# Patient Record
Sex: MALE | Race: WHITE | NOT HISPANIC OR LATINO | Employment: UNEMPLOYED | URBAN - METROPOLITAN AREA
[De-identification: names, ages, dates, MRNs, and addresses within clinical notes are randomized per-mention and may not be internally consistent; named-entity substitution may affect disease eponyms.]

---

## 2020-11-23 ENCOUNTER — COMMUNICATION - HEALTHEAST (OUTPATIENT)
Dept: SCHEDULING | Facility: CLINIC | Age: 40
End: 2020-11-23

## 2020-11-23 ENCOUNTER — AMBULATORY - HEALTHEAST (OUTPATIENT)
Dept: BEHAVIORAL HEALTH | Facility: CLINIC | Age: 40
End: 2020-11-23

## 2021-04-14 ENCOUNTER — AMBULATORY - HEALTHEAST (OUTPATIENT)
Dept: CARE COORDINATION | Facility: HOSPITAL | Age: 41
End: 2021-04-14

## 2021-04-24 ENCOUNTER — COMMUNICATION - HEALTHEAST (OUTPATIENT)
Dept: SCHEDULING | Facility: CLINIC | Age: 41
End: 2021-04-24

## 2021-06-13 NOTE — PROGRESS NOTES
S: 40/M to F, [facility] ED, post SA. Report from GUILLERMO Thompson, at 0530.     B: Hx of alcohol use, MDD, PTSD and DONNA.   Pt arrives to the ED post SA via overdosing on 15 tablets of trazodone and alcohol.   Pt reports depressed mood for months and has had frequent thoughts of suicide, increased anxiety, feelings of hopelessness and helplessness. Pt lost her housing in May, has been couch surfing and living in her vehicle.   Pt reports she was discharged from [facility] a few days ago and did not follow up w/ OP services nor medication regiment.   Pt denies SIB/HI and psychotic sxs.  Pt reports regular etoh use and huffing aerosols. Pt reports hx of alcohol withdrawal seizures.    A: Voluntary  No medical concerns. Ambulates / drink / eats ok.   Asymptomatic for Covid - test has been collected and is in process.    at 2234. Drug screen not ordered.     R: Pt placed on wait list pending available placement.

## 2021-06-13 NOTE — PROGRESS NOTES
R:  11/23/20  11:40 AM  Patient accepted by [facility] on 5500.  Placed in 5500 queue.  Called [facility]  ED w/ update and also requesting a Utox be ordered and collected.  Called 5500 and disposition given.    R:  1 PM  Called 5500-per charge nurse, nurse to nurse can be done in 20 minutes and then patient can transfer to unit.  Updated patient's ED RN and provider.

## 2021-06-16 PROBLEM — T50.901A OVERDOSE: Status: ACTIVE | Noted: 2020-11-23

## 2021-06-16 PROBLEM — F41.1 GAD (GENERALIZED ANXIETY DISORDER): Chronic | Status: ACTIVE | Noted: 2020-11-23

## 2021-06-16 PROBLEM — F10.20 SEVERE ALCOHOL USE DISORDER (H): Chronic | Status: ACTIVE | Noted: 2020-11-23

## 2021-06-16 PROBLEM — F33.1 MAJOR DEPRESSIVE DISORDER, RECURRENT EPISODE, MODERATE (H): Chronic | Status: ACTIVE | Noted: 2021-04-15

## 2021-06-16 PROBLEM — F43.12 CHRONIC POST-TRAUMATIC STRESS DISORDER (PTSD): Chronic | Status: ACTIVE | Noted: 2020-11-23

## 2021-06-16 PROBLEM — F18.10: Chronic | Status: ACTIVE | Noted: 2020-11-23

## 2021-06-16 NOTE — PROGRESS NOTES
S: Yessenia, [facility] ED, 40/F she/her pronouns, SI     B: Hx of MDD, PTSD, sub use, TBI   Pt reports SI, chronically   Pt reports she is homeless and has not been taking her medications   Pt breathalyzer in the ED .045, denies any other sub use   Pt denies SIB   Pt reports that she is in the ED seeking help w safe housing. She completed tx and has been shuffling around. Pt reports that she doesn't have a  but would like someone to help her w housing     Med cleared   No covid concerns, test pending     A: Voluntary     R: 5500/Nissa     1003pm - Nissa, on call provider, paged   1005pm - Nissa accepts   Pt placed in queue   1008pm - unit charge unavail, will call intake back when able   1028pm - unit charge notified, after midnight for report   1028pm - ED SW notified

## 2023-02-24 ENCOUNTER — TRANSFERRED RECORDS (OUTPATIENT)
Dept: HEALTH INFORMATION MANAGEMENT | Facility: CLINIC | Age: 43
End: 2023-02-24

## 2023-03-03 ENCOUNTER — TRANSFERRED RECORDS (OUTPATIENT)
Dept: HEALTH INFORMATION MANAGEMENT | Facility: CLINIC | Age: 43
End: 2023-03-03

## 2024-05-22 ENCOUNTER — TELEPHONE (OUTPATIENT)
Dept: NURSING | Facility: CLINIC | Age: 44
End: 2024-05-22
Payer: MEDICARE

## 2024-05-22 NOTE — TELEPHONE ENCOUNTER
LILA Patel from [facility] [phone]    Received Fax- placed in your in box.for signature and to fax back.    Lead MA and Lead TC are working to accommodate arrival and check in process.

## 2024-05-22 NOTE — TELEPHONE ENCOUNTER
Lead Lisa and I called Amanda DE SOUZA, RN [facility] Nurse and informed her that we will fax her over a general consent for service to have patient sign ahead of time and fax back to us at [phone] Attn Lisa VENTURA And then once patient arrives for appointment we will have a quick check in and MA will be ready to bring patient right back to exam room to see provider for appointment to try to avoid wait time in [facility] area.

## 2024-05-28 ENCOUNTER — MEDICAL CORRESPONDENCE (OUTPATIENT)
Dept: HEALTH INFORMATION MANAGEMENT | Facility: CLINIC | Age: 44
End: 2024-05-28

## 2024-05-28 ENCOUNTER — OFFICE VISIT (OUTPATIENT)
Dept: FAMILY MEDICINE | Facility: CLINIC | Age: 44
End: 2024-05-28
Payer: MEDICARE

## 2024-05-28 VITALS
DIASTOLIC BLOOD PRESSURE: 80 MMHG | SYSTOLIC BLOOD PRESSURE: 128 MMHG | BODY MASS INDEX: 34.07 KG/M2 | TEMPERATURE: 98.1 F | WEIGHT: 230 LBS | OXYGEN SATURATION: 98 % | RESPIRATION RATE: 16 BRPM | HEART RATE: 106 BPM | HEIGHT: 69 IN

## 2024-05-28 DIAGNOSIS — Z91.89 ENCOUNTER FOR HEPATITIS C VIRUS SCREENING TEST FOR HIGH RISK PATIENT: ICD-10-CM

## 2024-05-28 DIAGNOSIS — Z51.81 ENCOUNTER FOR THERAPEUTIC DRUG MONITORING: ICD-10-CM

## 2024-05-28 DIAGNOSIS — F19.10 POLYSUBSTANCE ABUSE (H): ICD-10-CM

## 2024-05-28 DIAGNOSIS — F10.20 SEVERE ALCOHOL USE DISORDER (H): Chronic | ICD-10-CM

## 2024-05-28 DIAGNOSIS — F19.20 OTHER PSYCHOACTIVE SUBSTANCE DEPENDENCE, UNCOMPLICATED (H): ICD-10-CM

## 2024-05-28 DIAGNOSIS — Z11.59 ENCOUNTER FOR HEPATITIS C VIRUS SCREENING TEST FOR HIGH RISK PATIENT: ICD-10-CM

## 2024-05-28 DIAGNOSIS — B18.2 CHRONIC HEPATITIS C WITHOUT HEPATIC COMA (H): ICD-10-CM

## 2024-05-28 DIAGNOSIS — F64.0 GENDER DYSPHORIA IN ADULT: Primary | ICD-10-CM

## 2024-05-28 LAB
ALBUMIN SERPL BCG-MCNC: 4.4 G/DL (ref 3.5–5.2)
ALP SERPL-CCNC: 61 U/L (ref 40–150)
ALT SERPL W P-5'-P-CCNC: 97 U/L (ref 0–70)
ANION GAP SERPL CALCULATED.3IONS-SCNC: 12 MMOL/L (ref 7–15)
AST SERPL W P-5'-P-CCNC: 61 U/L (ref 0–45)
BASOPHILS # BLD AUTO: 0 10E3/UL (ref 0–0.2)
BASOPHILS NFR BLD AUTO: 0 %
BILIRUB SERPL-MCNC: 0.4 MG/DL
BUN SERPL-MCNC: 14.7 MG/DL (ref 6–20)
CALCIUM SERPL-MCNC: 9.3 MG/DL (ref 8.6–10)
CHLORIDE SERPL-SCNC: 106 MMOL/L (ref 98–107)
CREAT SERPL-MCNC: 0.77 MG/DL (ref 0.51–1.17)
DEPRECATED HCO3 PLAS-SCNC: 22 MMOL/L (ref 22–29)
EGFRCR SERPLBLD CKD-EPI 2021: >90 ML/MIN/1.73M2
EOSINOPHIL # BLD AUTO: 0.1 10E3/UL (ref 0–0.7)
EOSINOPHIL NFR BLD AUTO: 2 %
ERYTHROCYTE [DISTWIDTH] IN BLOOD BY AUTOMATED COUNT: 12.3 % (ref 10–15)
GLUCOSE SERPL-MCNC: 133 MG/DL (ref 70–99)
HCT VFR BLD AUTO: 41.5 % (ref 35–53)
HCV AB SERPL QL IA: REACTIVE
HGB BLD-MCNC: 14 G/DL (ref 11.7–17.7)
IMM GRANULOCYTES # BLD: 0 10E3/UL
IMM GRANULOCYTES NFR BLD: 0 %
LYMPHOCYTES # BLD AUTO: 1.8 10E3/UL (ref 0.8–5.3)
LYMPHOCYTES NFR BLD AUTO: 42 %
MCH RBC QN AUTO: 30 PG (ref 26.5–33)
MCHC RBC AUTO-ENTMCNC: 33.7 G/DL (ref 31.5–36.5)
MCV RBC AUTO: 89 FL (ref 78–100)
MONOCYTES # BLD AUTO: 0.6 10E3/UL (ref 0–1.3)
MONOCYTES NFR BLD AUTO: 13 %
NEUTROPHILS # BLD AUTO: 1.9 10E3/UL (ref 1.6–8.3)
NEUTROPHILS NFR BLD AUTO: 43 %
PLATELET # BLD AUTO: 275 10E3/UL (ref 150–450)
POTASSIUM SERPL-SCNC: 4 MMOL/L (ref 3.4–5.3)
PROT SERPL-MCNC: 7.9 G/DL (ref 6.4–8.3)
RBC # BLD AUTO: 4.67 10E6/UL (ref 3.8–5.9)
SHBG SERPL-SCNC: 81 NMOL/L (ref 11–135)
SODIUM SERPL-SCNC: 140 MMOL/L (ref 135–145)
WBC # BLD AUTO: 4.4 10E3/UL (ref 4–11)

## 2024-05-28 PROCEDURE — 84403 ASSAY OF TOTAL TESTOSTERONE: CPT | Performed by: FAMILY MEDICINE

## 2024-05-28 PROCEDURE — 85025 COMPLETE CBC W/AUTO DIFF WBC: CPT | Mod: QW | Performed by: FAMILY MEDICINE

## 2024-05-28 PROCEDURE — 99000 SPECIMEN HANDLING OFFICE-LAB: CPT | Performed by: FAMILY MEDICINE

## 2024-05-28 PROCEDURE — 36415 COLL VENOUS BLD VENIPUNCTURE: CPT | Performed by: FAMILY MEDICINE

## 2024-05-28 PROCEDURE — 80053 COMPREHEN METABOLIC PANEL: CPT | Performed by: FAMILY MEDICINE

## 2024-05-28 PROCEDURE — 87902 NFCT AGT GNTYP ALYS HEP C: CPT | Mod: 90 | Performed by: FAMILY MEDICINE

## 2024-05-28 PROCEDURE — 84270 ASSAY OF SEX HORMONE GLOBUL: CPT | Performed by: FAMILY MEDICINE

## 2024-05-28 PROCEDURE — 86803 HEPATITIS C AB TEST: CPT | Performed by: FAMILY MEDICINE

## 2024-05-28 PROCEDURE — 99204 OFFICE O/P NEW MOD 45 MIN: CPT | Performed by: FAMILY MEDICINE

## 2024-05-28 PROCEDURE — 87522 HEPATITIS C REVRS TRNSCRPJ: CPT | Performed by: FAMILY MEDICINE

## 2024-05-28 RX ORDER — ESTRADIOL VALERATE 20 MG/ML
INJECTION INTRAMUSCULAR
COMMUNITY
Start: 2023-03-01 | End: 2024-07-30

## 2024-05-28 RX ORDER — HYDROXYZINE HYDROCHLORIDE 25 MG/1
25 TABLET, FILM COATED ORAL
COMMUNITY
Start: 2022-09-28

## 2024-05-28 RX ORDER — ESTRADIOL 2 MG/1
2 TABLET ORAL DAILY
Qty: 30 TABLET | Refills: 1 | Status: SHIPPED | OUTPATIENT
Start: 2024-05-28 | End: 2024-07-30

## 2024-05-28 RX ORDER — AMLODIPINE BESYLATE 5 MG/1
5 TABLET ORAL DAILY
COMMUNITY
Start: 2022-09-29 | End: 2025-04-17

## 2024-05-28 RX ORDER — SPIRONOLACTONE 100 MG/1
100 TABLET, FILM COATED ORAL DAILY
Qty: 60 TABLET | Refills: 1 | Status: SHIPPED | OUTPATIENT
Start: 2024-05-28
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
## 2024-05-28 NOTE — PATIENT INSTRUCTIONS
" Name__________________   and     MRN___________________   or   Label Here        Informed Consent   Feminizing Medications      This form refers to the use of estrogen and/or androgen antagonists (sometimes called \"anti-androgens\" or \"androgen blockers\") by persons in the male-to-female spectrum who wish to become feminized to reduce gender dysphoria and facilitate a more feminine gender presentation. While there are risks associated with taking feminizing medications, when appropriately prescribed they can greatly improve mental health and quality of life.     Please seek another opinion if you want additional perspective on any aspect of your care.       Feminizing Effects     1. I understand that estrogen, androgen antagonists, or a combination of the two may be  prescribed to reduce male physical features and feminize my body.     2. I understand that the feminizing effects of estrogen and androgen antagonists can take several  months to a year to become noticeable, speed and degree of change is unpredictable.     3.  I understand that if I am taking estrogen I will probably develop breasts, and:        Breasts may take several years to develop to their full size.   Even if estrogen is stopped, the breast tissue that has developed will remain.   As soon as breasts start growing, it is recommended to have an annual breast exam.  There may be milky nipple discharge (galactorrhea). If you develop this, it is advised to check with a doctor to determine the cause.   It is not known if taking estrogen increases the risk of breast cancer.     4. I understand that the following changes are generally not permanent (that is, they will likely reverse if I stop taking feminizing medications):     Skin may become softer.   Muscle mass decreases and there may be a decrease in upper body strength.   Body hair growth may become less noticeable and grow more slowly,but won't stop.  Male pattern baldness may slow down, but will " "probably not stop completely, and hair that has already been lost will likely not grow back.   Fat may redistribute to a more feminine pattern (decreased in abdomen, increased on buttocks/hips/thighs - changing from \"apple shape\" to \"pear shape\").       5. I understand that taking feminizing medications will make my testicles produce less testosterone, which can affect my overall sexual function:    Fertility may be impaired, and I should consider sperm banking if I desire biological children.  Sperm may not mature, leading to reduced fertility. It is still possible to get someone pregnant however and contraception should be used if needed.  Testicles may shrink by 25-50%. Testicular examinations are still recommended.  The amount of fluid ejaculated may be reduced.   There is typically decrease in morning and spontaneous erections.   Erections may not be firm enough for penetrative sex.   Libido (sex drive) may decrease.     6. I understand that there are some aspects of my body that are not significantly changed by feminizing medications, though there may be other treatments that can be helpful.    Blackmon/facial hair may grow more slowly and be less noticeable, but will not go away.   Voice pitch will not rise and speech patterns will not become more feminine.   The laryngeal prominence (\"Larry's apple\") will not shrink.      Risks of Feminizing Medications     7. I understand that the medical effects and safety of feminizing medications are not fully understood, and that there may be long-term risks that are not yet known.     8. I understand that I am strongly advised not to take more medication than I am prescribed, as this increases health risks. It won't help changes come faster.     9. I understand that feminizing medications can damage the liver. I have been advised that I should be monitored for possible liver damage as long as I am taking feminizing medications.     10. I understand that feminizing " medications will result in changes that will be noticeable by other people, and that some people in similar circumstances have experienced harassment, discrimination, and violence, while others have lost support of loved ones. I have been advised that referrals can be made for support/counselling if this would be helpful.     Medical Risks Associated with Estrogen     11. I understand that taking estrogen increases the risk of blood clots, which can result in:     Pulmonary embolism (blood clot to the lungs), which may cause death.  Stroke, which may cause permanent brain damage or death   Heart attack   Chronic leg vein problems     I understand that the risk of blood clots is much worse if I smoke cigarettes, especially over age 40. I understand that the danger is so high that I should stop smoking completely if I start taking estrogen. I can ask my doctor for advice on quitting.    12. I understand that taking estrogen can increase deposits of fat around my internal organs, which is associated with increased risk for diabetes and heart disease.     13. I understand that taking estrogen can cause increased blood pressure,  estrogen increases the risk of gallstones,  estrogen can cause headaches or migraines and can cause nausea and vomiting. I have been advised that if I develop these, it is recommended that I discuss this with my doctor.     14. I understand that it is not known if taking estrogen increases the risk of non-cancerous tumors of the pituitary gland. I have been informed that although this is typically not life-threatening, it can damage vision. I understand that this will be monitored.    15. I have been informed that I am more likely to have dangerous side effects from estrogen if I smoke, am overweight, am over 40 years old, or have a history of blood clots, high blood pressure, or a family history of breast cancer.     16. I have been informed that if I take too much estrogen, my body may convert  it into testosterone, which may slow or stop feminization.     Medical Risks Associated with Androgen Antagonists     17. I have been informed that spironolactone affects the balance of water and salts in the kidneys, and that this may:     Increase the amount of urine produced, and I may urinate more frequently   Reduce blood pressure   Rarely, cause high levels of potassium in the blood, and this will be monitored    18. I understand that some androgen antagonists make it more difficult to evaluate the results of PSA test. If I am over 50, I should have my prostate evaluated every year.     Prevention of Medical Complications     19. I agree to take feminizing medications as prescribed and to tell my care provider if I am not happy with the treatment or am experiencing any problems.     20. I understand that the right dose or type of medication prescribed for me may not be the same as for someone else.     21. I understand that physical examinations and blood tests are needed on a regular basis (monthly, at first) to check for negative side effects of feminizing medications.     22. I understand that feminization medications can interact with other medication (including other sources of hormones), dietary supplements, herbs, alcohol, and street drugs. I understand that being honest with my care provider about what else I am taking will help prevent medical complications that could be life-threatening. I have been informed that I will continue to get medical care no matter what information I share.     23. I understand that some medical conditions make it dangerous to take estrogen or androgen antagonists. I agree to be checked for risky conditions before the decision to start or continue feminizing medication is made.     24. I understand that I can choose to stop taking feminizing medication at any time, and that it is advised that I do this with the help of my doctor to make sure there are no negative reactions  to stopping. I understand that my doctor may suggest I reduce, switch or stop taking feminizing medication, if there are severe health risks that can't be controlled.    My signature below confirms that:     My doctor has talked with me about the benefits and risks of feminizing medication, the possible or likely consequences of hormone therapy, and potential treatment options.   I understand the risks that may be involved.   I understand that this form covers known effects and risks and that there may be long-term effects or risks that are not yet known.   I have had sufficient opportunity to discuss treatment options with my doctor. All of my questions have been answered to my satisfaction.   I believe I have adequate knowledge on which to base informed consent to the provision of feminizing medication.     Based on this:     _____ I wish to begin taking estrogen.     _____ I wish to begin taking androgen antagonists (e.g., Spironolactone).     _____ I do not wish to begin taking feminizing medication at this time.     Whatever your current decision is, please talk with your doctor any time you have questions, concerns, or want to re-evaluate your options.         ____________________________________ __________________   Patient Signature     Date         ____________________________________ __________________   Prescribing Clinician Signature    Date

## 2024-05-28 NOTE — LETTER
"June 6, 2024      Artemio CHARLOTTE Dickey  [address] [place] [address]        Dear ,    We are writing to inform you of your test results.    You do still have active Hepatitis C.  I'm including a copy of your labs.  You would benefit from treatment.     Resulted Orders   Comprehensive metabolic panel (BMP + Alb, Alk Phos, ALT, AST, Total. Bili, TP)   Result Value Ref Range    Sodium 140 135 - 145 mmol/L      Comment:      Reference intervals for this test were updated on 09/26/2023 to more accurately reflect our healthy population. There may be differences in the flagging of prior results with similar values performed with this method. Interpretation of those prior results can be made in the context of the updated reference intervals.     Potassium 4.0 3.4 - 5.3 mmol/L    Carbon Dioxide (CO2) 22 22 - 29 mmol/L    Anion Gap 12 7 - 15 mmol/L    Urea Nitrogen 14.7 6.0 - 20.0 mg/dL    Creatinine 0.77 0.51 - 1.17 mg/dL      Comment:      Male and Female  0-2 Months    0.31-0.88 mg/dL  2-12 Months   0.16-0.39 mg/dL  1-2 Years     0.18-0.35 mg/dL  3-4 Years     0.26-0.42 mg/dL  5-6 Years     0.29-0.47 mg/dL  7-8 Years     0.34-0.53 mg/dL  9-10 Years    0.33-0.64 mg/dL  11-12 Years   0.44-0.68 mg/dL  13-14 Years   0.46-0.77 mg/dL    Female  15 Years and older  0.51-0.95 mg/dL    Male  15 Years and older  0.67-1.17 mg/dL        GFR Estimate >90 >60 mL/min/1.73m2      Comment:      The generation of the estimated GFR is currently based on binary male or female sex. If the electronic health record information indicates another gender identity or if Legal Sex is recorded as \"Unknown\", GFR estimates are not automatically calculated, and application of GFR equations or a direct GFR measurement should be considered according to the individual's appropriate clinical context.    Calcium 9.3 8.6 - 10.0 mg/dL    Chloride 106 98 - 107 mmol/L    Glucose 133 (H) 70 - 99 mg/dL    Alkaline Phosphatase 61 40 - 150 U/L     " " Comment:      Female:    0-15 days     U/L  15d-1 year   122-469 U/L  1-10 years   142-335 U/L  10-13 years  129-417 U/L  13-15 years   U/L  15-17 years   U/L  17-19 years  45-87 U/L  19 years and older   U/L      Male:  0-15 days     U/L  15d-1 year   122-469 U/L  1-10 years   142-335 U/L  10-13 years  129-417 U/L  13-15 years  116-468 U/L  15-17 years   U/L  17-19 years   U/L  19 years and older   U/L      AST 61 (H) 0 - 45 U/L      Comment:      Reference intervals for this test were updated on 6/12/2023 to more accurately reflect our healthy population. There may be differences in the flagging of prior results with similar values performed with this method. Interpretation of those prior results can be made in the context of the updated reference intervals.    ALT 97 (H) 0 - 70 U/L      Comment:      Female   All ages       0-50 U/L     Male   0-20 Years     0-50 U/L  20-Unsp. Years 0-70 U/L      Reference intervals for this test were updated on 6/12/2023 to more accurately reflect our healthy population. There may be differences in the flagging of prior results with similar values performed with this method. Interpretation of those prior results can be made in the context of the updated reference intervals.      Protein Total 7.9 6.4 - 8.3 g/dL    Albumin 4.4 3.5 - 5.2 g/dL    Bilirubin Total 0.4 <=1.2 mg/dL    Narrative    The generation of reference intervals for this test is currently based on binary male or female sex. If the electronic health record information indicates another gender identity or if Legal Sex is recorded as \"Unknown\", both male and female reference intervals are provided where applicable, and should be considered according to the individual's appropriate clinical context.   Sex Hormone Binding Globulin   Result Value Ref Range    Sex Hormone Binding Globulin 81 11 - 135 nmol/L      Comment:      Female Reference Range:  Jerald Stage I: " " nmol/L  Jerald Stage II:  nmol/L  Jerald Stage III: 12-98 nmol/L  Jerald Stage IV:  nmol/L  Jerald Stage V:  nmol/L    Male Reference Range:  Jerald Stage I:  nmol/L  Jerald Stage II:  nmol/L  Jerald Stage III:  nmol/L  Jerald Stage IV: 11-60 nmol/L  Jerald Stage V: 11-71 nmol/L    Narrative    The generation of reference intervals for this test is currently based on binary male or female sex. If the electronic health record information indicates another gender identity or if Legal Sex is recorded as \"Unknown\", both male and female reference intervals are provided where applicable, and should be considered according to the individual's appropriate clinical context.   Testosterone Free and Total   Result Value Ref Range    Free Testosterone Calculated 8.92 ng/dL    Testosterone Total 761 8 - 950 ng/dL      Comment:        MALE:  0 to 5 months:  ng/dL  6 months to 9 years: <2-20 ng/dL  10 to 11 years: <2-130 ng/dL  12 to 13 years: <2-800 ng/dL  14 years: <2-1200 ng/dL  15 to 16 years: 100-1200 ng/dL  17 to 18 years: 300-1200 ng/dL  19 years and older: 240-950 ng/dL    FEMALE:  0 to 5 months: 20-80 ng/dL  6 months to 9 years: <2-20 ng/dL  10 to 11 years: <2-44 ng/dL  12 to 16 years: <2-75 ng/dL  17 to 18 years: 20-75 ng/dL  19 years and older: 8-60 ng/dL    Values by Jerald Stage:  Stage I (prepubertal)  Male: <2 to 20 ng/dL  Female: <2 to 20 ng/dL    Stage II  Male: 8 to 66 ng/dL  Female: <2 to 47 ng/dL    Stage III  Male: 26 to 800 ng/dL  Female: 17 to 75 ng/dL    Stage IV  Male: 85 to 1200 ng/dL  Female: 20 to 75 ng/dL    Stage V (young adult)  Male: 300 to 950 ng/dL  Female: 12 to 60 ng/dL    Puberty onset (transition from Jerald Stage I to Jerald Stage II) occurs for boys at a median age of 11.5 years and for girls at median age of 10.5 years. There is evidence that it may occur up to 1 year  earlier in obese girls and in  girls. For boys there is no " "definite proven relationship between puberty onset and body weight or ethnic origin. Progression through Jerald stages is variable. Jerald Stage V (young adult) should be reached by age 18.        Narrative    The generation of reference intervals for this test is currently based on binary male or female sex. If the electronic health record information indicates another gender identity or if Legal Sex is recorded as \"Unknown\", both male and female reference intervals are provided where applicable, and should be considered according to the individual's appropriate clinical context.  This test was developed and its performance characteristics determined by the [facility],  Special Chemistry Laboratory. It has not been cleared or approved by the FDA. The laboratory is regulated under CLIA as qualified to perform high-complexity testing. This test is used for clinical purposes. It should not be regarded as investigational or for research.   CBC with platelets and differential   Result Value Ref Range    WBC Count 4.4 4.0 - 11.0 10e3/uL    RBC Count 4.67 3.80 - 5.90 10e6/uL      Comment:      Reference Range:                                                     Female 3.80-5.20 10e6/uL                                      Male 4.40-5.90 10e6u/L    Hemoglobin 14.0 11.7 - 17.7 g/dL      Comment:      Reference Range:                                                     Female 11.7-15.7 g/dL                                      Male 13.3-17.7 g/dL    Hematocrit 41.5 35.0 - 53.0 %      Comment:      Reference Range:                                                     Female 35.0-47.0 %                                            Male 40.0-54.0 %    MCV 89 78 - 100 fL    MCH 30.0 26.5 - 33.0 pg    MCHC 33.7 31.5 - 36.5 g/dL    RDW 12.3 10.0 - 15.0 %    Platelet Count 275 150 - 450 10e3/uL    % Neutrophils 43 %    % Lymphocytes 42 %    % Monocytes 13 %    % Eosinophils 2 %    % Basophils 0 %    % " "Immature Granulocytes 0 %    Absolute Neutrophils 1.9 1.6 - 8.3 10e3/uL    Absolute Lymphocytes 1.8 0.8 - 5.3 10e3/uL    Absolute Monocytes 0.6 0.0 - 1.3 10e3/uL    Absolute Eosinophils 0.1 0.0 - 0.7 10e3/uL    Absolute Basophils 0.0 0.0 - 0.2 10e3/uL    Absolute Immature Granulocytes 0.0 <=0.4 10e3/uL    Narrative    The generation of reference intervals for this test is currently based on binary male or female sex. If the electronic health record information indicates another gender identity or if Legal Sex is recorded as \"Unknown\", both male and female reference intervals are provided where applicable, and should be considered according to the individual's appropriate clinical context.   Hepatitis C Screen Reflex to HCV RNA Quant and Genotype   Result Value Ref Range    Hepatitis C Antibody Reactive (A) Nonreactive      Comment:      The detection of anti-HCV antibodies indicates a current HCV infection or past infection that has resolved, or false HCV antibody positivity.     The CDC recommends that a reactive result should be followed by Nucleic acid testing for HCV RNA. If HCV RNA is detected, that indicates current HCV infection.    If HCV RNA is not detected, that indicates either past, resolved HCV infection, or false HCV antibody positivity.   Hepatitis C RNA, Quantitative by PCR with Confirmatory Reflex to Genotyping   Result Value Ref Range    Hepatitis C log 6.6     Hepatitis C RNA IU/mL, Instrument 4,090,000 (H) Not Detected IU/mL    Narrative    The margo  Hepatitis C assay is an FDA-approved in vitro nucleic acid amplification test for the quantification of Hepatitis C virus (HCV) RNA in human EDTA plasma or serum, using the Roche margo  6800 instrument for automated viral nucleic acid extraction, purification, amplification, and detection of the viral nucleic acid target. This assay utilizes dual probes to detect and quantify, but not discriminate genotypes 1-6. The test is intended for use as an " aid in the diagnosis of HCV infection and an aid in the management of HCV-infected patients undergoing anti-viral therapy. Titer results are reported in IU/mL.   Hepatitis C High Resolution Genotype   Result Value Ref Range    Hepatitis C High Resolution 1a       Comment:      INTERPRETIVE INFORMATION: Hepatitis C High Resolution        Genotype     Hepatitis C viral RNA is assayed using reverse   transcription polymerase chain reaction (RT-PCR) to amplify   specific portions of both the core and NS5B regions of the   viral genome. The amplified nucleic acid is sequenced   bidirectionally using dye-terminator chemistry ([employer]).   Sequencing data is compared to a database of characterized   sequences.    Isolates of hepatitis C virus are grouped into six major   genotypes (1-6). These genotypes are subtyped according to   sequence characteristics. Sequencing both the core and NS5B   regions allows for subtyping of all confirmed and most   provisional genotypes, including differentiation of 1a from   1b and typing of genotype 6.    This test was developed and its performance characteristics   determined by [facility]. It has not been cleared or   approved by the U.S. Food and Drug Administration. This   test was performed in a CLIA-certified laboratory  and is   intended for clinical purposes.  Performed By: [facility]  [address] [place] [id number]  : Myles Lima MD, PhD  CLIA Number: [id number]       If you have any questions or concerns, please call the clinic at the number listed above.       Sincerely,      Claire Miles MD

## 2024-05-30 LAB
HCV RNA SERPL NAA+PROBE-ACNC: ABNORMAL IU/ML
HCV RNA SERPL NAA+PROBE-LOG IU: 6.6 {LOG_IU}/ML
TESTOST FREE SERPL-MCNC: 8.92 NG/DL
TESTOST SERPL-MCNC: 761 NG/DL (ref 8–950)

## 2024-06-03 LAB — HCV GENTYP SERPL NAA+PROBE: NORMAL

## 2024-06-05 ENCOUNTER — TELEPHONE (OUTPATIENT)
Dept: FAMILY MEDICINE | Facility: CLINIC | Age: 44
End: 2024-06-05
Payer: MEDICARE

## 2024-06-05 ENCOUNTER — PATIENT OUTREACH (OUTPATIENT)
Dept: CARE COORDINATION | Facility: CLINIC | Age: 44
End: 2024-06-05
Payer: MEDICARE

## 2024-06-05 DIAGNOSIS — B18.2 CHRONIC HEPATITIS C WITHOUT HEPATIC COMA (H): Primary | ICD-10-CM

## 2024-06-05 NOTE — TELEPHONE ENCOUNTER
Call returned by LILA Jean-Baptiste, and was given message from provider. Jerilyn is requesting to have a document to have pt sign giving permission/consent for provider to submit an E-consult with GI for treatment recommendations. Discussed request with [facility] staff who will be assisting with this document.   [facility] fax [phone].

## 2024-06-05 NOTE — LETTER
[facility] CARE COORDINATION  No address on file   June 5, 2024    Artemio PORTER Noble  [facility]  [address]  [address]  [place] [address]        Dear Artemio,    I am a clinic care coordinator who works with Physician Chelle Ref-Primary with the [facility]. I wanted to thank you for spending the time to talk with me.  Below is a description of the resources we talked about together on the phone.     [facility]  https://www.voamnwi.org/reconnect-services  [facility] [address] [place] [address]  [phone]  [facility] provides a free drop-in service center to help individuals who have been incarcerated connect to the ingredients of day-to-day living such as housing, employment, and transportation. Most of the people we serve have recently left a correctional facility and are experiencing how difficult it can be for people with criminal records to find basic necessities like employment and stable housing. Some people return to Reconnect in need of assistance such as low-cost transit passes or connections to meals, clothing, or furniture.    [employer]  https://[facility].org/resource/[facility]/  [address] [address]  [phone]  [facility] is a program of [employer] that provides sober housing, transitional services and pro-social support to those in recovery and reentry throughout the [facility].  Each of our homes enlist the community to use a [place] of support model to manage and support those citizens returning to the community. The [place] meets regularly to facilitate the transitioning members  practical needs, provide emotional support, challenge the behaviors and attitudes, and develop constructive and pro-social strategies to address everyday problems.    [facility] [facility]   https://[employer]\[facility]\""[employer].org/programs/single-adults/  ([phone]  [facility] is a collaboration  between [employer] and [employer] to provide increased supportive services in two housing communities in [facility]. The properties offer efficiency apartments for long-term homeless individuals. Residents are connected to ongoing case management and opportunities for community engagement.    [employer]   https://www.[employer] Services[employer].org/  [phone]  We are in the business of housing people, not screening them out. We give nearly everyone a 2nd chance, whether or not they ve got criminal, housing and credit mars in their past.    [employer]  https://beyondbackgrounds.[employer].co/  Are you denied housing in [place] because of a criminal background, low credit score, or recent eviction? [employer] by [employer] can help!    MAD DADs Supports and Resources - http://minneapolismaddads.org/PDF/Homeless-Services-Listing.pdf    [facility] - https://www.[facility].org/  A nonprofit clinic committed to improving the health of LGBTQ individuals through affordable, respectful sexual health care and education.2    [place] TRANS HEALTH COALITION - https://www.[place]transgenderhealth.org/  The [place] Transgender Health Coalition is committed to improving health care access and the quality of health care received by trans and gender non-conforming people through education, resources, and advocacy.    [employer]IV[employer] - https://aliveness.org/  Offers HIV support services such as, healthy meals, food shelf, nutrition therapy, case management and housing resources, HIV services, and peer led support groups.    [facility] - https://med.[facility].edu/sexualhealth  Counseling services on sexuality and gender identity, including gender confirmation surgery.    [place] LGBTQ+ THERAPISTS NETWORK - https://lgbttherapists.[facility].org/  Network of lesbian, monae, bisexual, transgender, queer, questioning, intersex, pansexual, two-spirit, and asexual affirming mental health  and social service workers and their allies in [place].    [employer] - https://tigerrs.org/  [employer] is a nonhierarchical collective dedicated to delivering programs and resources that build solidarity and safety among transgender, intersex and gender-expansive [place]. Offers Intergenerational events, Intersex services, and youth events and services.    [employer] - https://[employer]/  [employer] is a member-supported, [facility] non-profit library and community center for the lesbian, monae, bisexual, and transgender community. It is located in [place], [place].    TRANS AND QUEER AA MEETINGS  Tuesdays at 8:15pm at [facility].    [employer]  [phone]    Please feel free to contact me with any questions or concerns regarding care coordination and what we can offer.      We are focused on providing you with the highest-quality healthcare experience possible.    Sincerely,     [facility] Clinical Care Coordinator  [facility], and [facility]   [phone]

## 2024-06-05 NOTE — TELEPHONE ENCOUNTER
Left message for Carry (prison RN) requesting call back to discuss.     ----- Message from Claire Miles MD sent at 6/4/2024  6:06 PM CDT -----  Please contact the [facility] prison nurse.  The phone number is [phone].  We have a consent to communicate on file to be able to communicate with her.  Please ask her to find out from the patient if she would be okay with me entering an E consult to see if they can give me recommendations for how to treat patient's hepatitis C.  If possible, I would like us to try to get this treated while patient has stable housing.  Also, please share with her that the patient's hepatitis C test is positive, patient has slightly elevated liver enzymes, CBC looks normal, and I can review the results of testosterone with patient when they come to clinic next.    Thanks,  Claire Miles MD

## 2024-06-05 NOTE — TELEPHONE ENCOUNTER
Left voicemail for Carry-[facility] RN.  At this time there is no additional form needed for an E Consult between providers.  That is included in the General Consent for Service that was already signed by the patient. In the future, if treatment or a procedure is needed, then there would be a Verification of Informed Consent for Treatment, Test, or Procedure Form to be signed by the patient.  I will fax a copy of the General Consent for Service form that Carry previously faxed to us if they need a copy on file.

## 2024-06-05 NOTE — PROGRESS NOTES
Clinic Care Coordination Contact  Follow Up Progress Note      Assessment: [facility] reached out to pt to provide resoruces.     Care Gaps:    Health Maintenance Due   Topic Date Due    LIPID  Never done    MICROALBUMIN  Never done    DIABETIC FOOT EXAM  Never done    ADVANCE CARE PLANNING  Never done    DEPRESSION ACTION PLAN  Never done    EYE EXAM  Never done    PHQ-9  Never done    HIV SCREENING  Never done    HEPATITIS B IMMUNIZATION (1 of 3 - 19+ 3-dose series) Never done    Pneumococcal Vaccine: Pediatrics (0 to 5 Years) and At-Risk Patients (6 to 64 Years) (2 of 2 - PCV) 03/05/2018    HEPATITIS A IMMUNIZATION (2 of 2 - Risk 2-dose series) 03/25/2018    A1C  07/26/2021    COVID-19 Vaccine (1 - 2023-24 season) Never done    MEDICARE ANNUAL WELLNESS VISIT  03/22/2024       Declined due to pt not able to schedule at this time     Care Plans - NA    Intervention/Education provided during outreach:     [facility]connect  https://www.[facility]atuamnwi.org/reconnect-services  [facility] [address] [place] [address]  [phone]  [facility] provides a free drop-in service center to help individuals who have been incarcerated connect to the ingredients of day-to-day living such as housing, employment, and transportation. Most of the people we serve have recently left a correctional facility and are experiencing how difficult it can be for people with criminal records to find basic necessities like employment and stable housing. Some people return to Reconnect in need of assistance such as low-cost transit passes or connections to meals, clothing, or furniture.    CLEAR  https://[facility].org/resource/[place]/  [address] [address]  [phone]  CLEAR is a program of [employer] [employer] that provides sober housing, transitional services and pro-social support to those in recovery and reentry throughout the [facility].  Each of our homes enlist the community to use a Jackson of  support model to manage and support those citizens returning to the community. The [employer] meets regularly to facilitate the transitioning members  practical needs, provide emotional support, challenge the behaviors and attitudes, and develop constructive and pro-social strategies to address everyday problems.    [employer] [facility]   https://[employer].org/programs/single-adults/  (215) 129-9388  [facility] [employer] [facility] is a collaboration between [employer], and [employer] to provide increased supportive services in two housing communities in [facility]. The properties offer efficiency apartments for long-term homeless individuals. Residents are connected to ongoing case management and opportunities for community engagement.    [employer]   https://www.oncgnostics GmbH.org/  [phone]  We are in the business of housing people, not screening them out. We give nearly everyone a 2nd chance, whether or not they ve got criminal, housing and credit mars in their past.    [employer]  https://beyondbackgrounds.Elpas.co/  Are you denied housing in [place] because of a criminal background, low credit score, or recent eviction? [employer] by [employer] can help!    [employer] Supports and Resources - http://Olantamaddads.org/PDF/Homeless-Services-Listing.pdf    [facility] - https://www.[facility]treeclinic.org/  A nonprofit clinic committed to improving the health of LGBTQ individuals through affordable, respectful sexual health care and education.2    [place] TRANS HEALTH COALITION - https://www.[place]transgenderhealth.org/  The [place] Transgender Health Coalition is committed to improving health care access and the quality of health care received by trans and gender non-conforming people through education, resources, and advocacy.    [employer]IV[employer] - https://aliveness.org/  Offers HIV support services such as, healthy meals, food shelf, nutrition  therapy, case management and housing resources, HIV services, and peer led support groups.    [facility] - https://med.[facility].edu/sexualhealth  Counseling services on sexuality and gender identity, including gender confirmation surgery.    [place] LGBTQ+ THERAPISTS NETWORK - https://lgbttherapists.GoingOnt.org/  Network of lesbian, monae, bisexual, transgender, queer, questioning, intersex, pansexual, two-spirit, and asexual affirming mental health and social service workers and their allies in [place].    [employer] - https://[employer].org/  [employer] is a nonhierarchical collective dedicated to delivering programs and resources that build solidarity and safety among transgender, intersex and gender-expansive [place]. Offers Intergenerational events, Intersex services, and youth events and services.    [employer] - https://Krauttools.org/  [employer] is a member-supported, [facility] non-profit library and community center for the lesbian, monae, bisexual, and transgender community. It is located in [place], [place].    TRANS AND QUEER AA MEETINGS  Tuesdays at 8:15pm at [facility].    Trans [employer]  [phone]                  Plan:   [facility] spoke with pt about resources and supports. Pt confirmed they are most concerned with housing. They would like to seek housing in the [facility] area. Pt would like to leave [facility] as they feel it is not a supportive area and they get into trouble in this area. Pt will be leaving [facility] in September. Resources provided via mail. Pt can reach out to this [facility] at any time. PCP can send a new referral at any time if pt establishes care with [facility].   Care Coordinator will do no additional follow-up at this time.

## 2024-06-06 NOTE — CONFIDENTIAL NOTE
Sp[facility] with [facility]ilyn the [facility].  She has spoken with Steph and Steph has agreed to LESLY thornton consult.

## 2024-06-11 ENCOUNTER — E-CONSULT (OUTPATIENT)
Dept: GASTROENTEROLOGY | Facility: CLINIC | Age: 44
End: 2024-06-11
Payer: MEDICARE

## 2024-06-11 PROCEDURE — 99207 PR NO BILLABLE SERVICE THIS VISIT: CPT | Performed by: INTERNAL MEDICINE
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
## 2024-06-13 ENCOUNTER — LAB (OUTPATIENT)
Dept: LAB | Facility: CLINIC | Age: 44
End: 2024-06-13
Payer: MEDICARE

## 2024-06-13 DIAGNOSIS — Z51.81 ENCOUNTER FOR THERAPEUTIC DRUG MONITORING: ICD-10-CM

## 2024-06-13 DIAGNOSIS — F64.0 GENDER DYSPHORIA IN ADULT: ICD-10-CM

## 2024-06-13 LAB
ALBUMIN SERPL BCG-MCNC: 4.5 G/DL (ref 3.5–5.2)
ALP SERPL-CCNC: 61 U/L (ref 40–150)
ALT SERPL W P-5'-P-CCNC: 70 U/L (ref 0–70)
ANION GAP SERPL CALCULATED.3IONS-SCNC: 11 MMOL/L (ref 7–15)
AST SERPL W P-5'-P-CCNC: 46 U/L (ref 0–45)
BASOPHILS # BLD AUTO: 0 10E3/UL (ref 0–0.2)
BASOPHILS NFR BLD AUTO: 0 %
BILIRUB SERPL-MCNC: 0.3 MG/DL
BUN SERPL-MCNC: 14.2 MG/DL (ref 6–20)
CALCIUM SERPL-MCNC: 9.4 MG/DL (ref 8.6–10)
CHLORIDE SERPL-SCNC: 104 MMOL/L (ref 98–107)
CHOLEST SERPL-MCNC: 157 MG/DL
CREAT SERPL-MCNC: 0.84 MG/DL (ref 0.51–1.17)
DEPRECATED HCO3 PLAS-SCNC: 23 MMOL/L (ref 22–29)
EGFRCR SERPLBLD CKD-EPI 2021: >90 ML/MIN/1.73M2
EOSINOPHIL # BLD AUTO: 0.1 10E3/UL (ref 0–0.7)
EOSINOPHIL NFR BLD AUTO: 2 %
ERYTHROCYTE [DISTWIDTH] IN BLOOD BY AUTOMATED COUNT: 12.2 % (ref 10–15)
ESTRADIOL SERPL-MCNC: 83 PG/ML
FASTING STATUS PATIENT QL REPORTED: ABNORMAL
FASTING STATUS PATIENT QL REPORTED: NORMAL
GLUCOSE SERPL-MCNC: 108 MG/DL (ref 70–99)
HCT VFR BLD AUTO: 44.2 % (ref 35–53)
HDLC SERPL-MCNC: 48 MG/DL
HGB BLD-MCNC: 14.7 G/DL (ref 11.7–17.7)
IMM GRANULOCYTES # BLD: 0 10E3/UL
IMM GRANULOCYTES NFR BLD: 0 %
LDLC SERPL CALC-MCNC: 87 MG/DL
LYMPHOCYTES # BLD AUTO: 2.3 10E3/UL (ref 0.8–5.3)
LYMPHOCYTES NFR BLD AUTO: 48 %
MCH RBC QN AUTO: 29.7 PG (ref 26.5–33)
MCHC RBC AUTO-ENTMCNC: 33.3 G/DL (ref 31.5–36.5)
MCV RBC AUTO: 89 FL (ref 78–100)
MONOCYTES # BLD AUTO: 0.5 10E3/UL (ref 0–1.3)
MONOCYTES NFR BLD AUTO: 11 %
NEUTROPHILS # BLD AUTO: 1.8 10E3/UL (ref 1.6–8.3)
NEUTROPHILS NFR BLD AUTO: 38 %
NONHDLC SERPL-MCNC: 109 MG/DL
PLATELET # BLD AUTO: 281 10E3/UL (ref 150–450)
POTASSIUM SERPL-SCNC: 4.1 MMOL/L (ref 3.4–5.3)
PROT SERPL-MCNC: 8.3 G/DL (ref 6.4–8.3)
RBC # BLD AUTO: 4.95 10E6/UL (ref 3.8–5.9)
SHBG SERPL-SCNC: 95 NMOL/L (ref 11–135)
SODIUM SERPL-SCNC: 138 MMOL/L (ref 135–145)
TRIGL SERPL-MCNC: 111 MG/DL
WBC # BLD AUTO: 4.8 10E3/UL (ref 4–11)

## 2024-06-13 PROCEDURE — 84403 ASSAY OF TOTAL TESTOSTERONE: CPT

## 2024-06-13 PROCEDURE — 80053 COMPREHEN METABOLIC PANEL: CPT

## 2024-06-13 PROCEDURE — 80061 LIPID PANEL: CPT

## 2024-06-13 PROCEDURE — 84270 ASSAY OF SEX HORMONE GLOBUL: CPT

## 2024-06-13 PROCEDURE — 85025 COMPLETE CBC W/AUTO DIFF WBC: CPT | Mod: QW

## 2024-06-13 PROCEDURE — 82670 ASSAY OF TOTAL ESTRADIOL: CPT

## 2024-06-13 PROCEDURE — 36415 COLL VENOUS BLD VENIPUNCTURE: CPT

## 2024-06-15 LAB
TESTOST FREE SERPL-MCNC: 6.37 NG/DL
TESTOST SERPL-MCNC: 645 NG/DL (ref 8–950)

## 2024-07-02 ENCOUNTER — VIRTUAL VISIT (OUTPATIENT)
Dept: FAMILY MEDICINE | Facility: CLINIC | Age: 44
End: 2024-07-02
Payer: MEDICARE

## 2024-07-02 DIAGNOSIS — B18.2 CHRONIC HEPATITIS C WITHOUT HEPATIC COMA (H): ICD-10-CM

## 2024-07-02 DIAGNOSIS — F64.0 GENDER DYSPHORIA IN ADULT: Primary | ICD-10-CM

## 2024-07-02 DIAGNOSIS — Z51.81 ENCOUNTER FOR THERAPEUTIC DRUG MONITORING: ICD-10-CM

## 2024-07-02 PROCEDURE — G2211 COMPLEX E/M VISIT ADD ON: HCPCS | Mod: 95 | Performed by: FAMILY MEDICINE

## 2024-07-02 PROCEDURE — 99214 OFFICE O/P EST MOD 30 MIN: CPT | Mod: 95 | Performed by: FAMILY MEDICINE

## 2024-07-02 NOTE — PROGRESS NOTES
Artemio is a 44 year old who is being evaluated via a billable video visit.          Assessment & Plan   Problem List Items Addressed This Visit       Chronic hepatitis C without hepatic coma (H)    Relevant Orders    Comprehensive metabolic panel (BMP + Alb, Alk Phos, ALT, AST, Total. Bili, TP)    Incarceration    Gender dysphoria in adult - Primary    Relevant Orders    Comprehensive metabolic panel (BMP + Alb, Alk Phos, ALT, AST, Total. Bili, TP)    Estradiol    Testosterone Free and Total     Other Visit Diagnoses       Encounter for therapeutic drug monitoring        Relevant Orders    Comprehensive metabolic panel (BMP + Alb, Alk Phos, ALT, AST, Total. Bili, TP)    Estradiol    Testosterone Free and Total        Patient and I are following up together further gender dysphoria.  Amanda, the halfway nurse is present for the appointment also.  Patient reports that they are concerned about the spironolactone not being a dose adequate enough to prevent hair growth.  Explained that the spironolactone will help us to decrease testosterone and may prevent new hairs from becoming hoarse but will not help to soften hair.  I will include a timeline of expected changes in the after visit summary that we can have faxed over so that bowel has a better understanding of expectations of when estrogen should be expected to cause various changes.  I will have Steph come in and get some labs drawn in about a week or 2.  We can check to see if the estrogen dose is appropriate at that time and recheck testosterone levels and comprehensive metabolic panel    Patient does have known hepatitis C.  Unfortunately the you are going to have to wait on treatment until after they get out of halfway.  Given that oral estrogen gets metabolized in the liver were going to also check liver enzymes without comprehensive metabolic panel.  If liver enzymes are significantly increased from baseline then we will switch to an estrogen patch.    We can plan to  follow-up based on those lab results in 3 to 4 weeks.            The longitudinal plan of care for the diagnosis(es)/condition(s) as documented were addressed during this visit. Due to the added complexity in care, I will continue to support Artemio in the subsequent management and with ongoing continuity of care.                 Eladia Ulloa is a 44 year old, presenting for the following health issues:  No chief complaint on file.      Video Start Time: 10:47 AM    HPI                 Objective           Vitals:  No vitals were obtained today due to virtual visit.    Physical Exam   GENERAL: alert and no distress  EYES: Eyes grossly normal to inspection.  No discharge or erythema, or obvious scleral/conjunctival abnormalities.  RESP: No audible wheeze, cough, or visible cyanosis.    SKIN: Visible skin clear. No significant rash, abnormal pigmentation or lesions.  NEURO: Cranial nerves grossly intact.  Mentation and speech appropriate for age.  PSYCH: Appropriate affect, tone, and pace of words          Video-Visit Details    Type of service:  Video Visit   Video End Time:10:57 AM  Originating Location (pt. Location): [facility]    Distant Location (provider location):  On-site  Platform used for Video Visit: Karla  Signed Electronically by: Claire Miles MD

## 2024-07-02 NOTE — PATIENT INSTRUCTIONS
Effects and Expected Time Course of Feminizing Hormones    Effect Expected Onset Expected Maximum Effect   Body Fat redistribution 3-6 months 2-5 years   Decreased Muscle mass 3-6 months 1-2 years   Softening of skin/decreased oiliness 3-6 months Unknown   Decreased Libido 1-3 months 1-2 years   Decreased Spontaneous Erections 1-3 months 3-6 months   Male Sexual Dysfunction Variable Variable   Breast Growth 3-6 months 2-3 years   Decreased Testicular volume 3-6 months 2-3 years   Decreased sperm production Variable Variable   Thinning and slowed growth of body and facial hair  + 6-12 months >3 years   Male pattern baldness No regrowth, loss stops 1-3 months 1-2 years   +complete removal of male facial hair and body hair requires electrolysis, laser treatment or both

## 2024-07-12 ENCOUNTER — LAB (OUTPATIENT)
Dept: LAB | Facility: CLINIC | Age: 44
End: 2024-07-12
Payer: MEDICARE

## 2024-07-12 DIAGNOSIS — Z51.81 ENCOUNTER FOR THERAPEUTIC DRUG MONITORING: ICD-10-CM

## 2024-07-12 DIAGNOSIS — F64.0 GENDER DYSPHORIA IN ADULT: ICD-10-CM

## 2024-07-12 DIAGNOSIS — B18.2 CHRONIC HEPATITIS C WITHOUT HEPATIC COMA (H): ICD-10-CM

## 2024-07-12 LAB
ALBUMIN SERPL BCG-MCNC: 4.3 G/DL (ref 3.5–5.2)
ALP SERPL-CCNC: 62 U/L (ref 40–150)
ALT SERPL W P-5'-P-CCNC: 82 U/L (ref 0–70)
ANION GAP SERPL CALCULATED.3IONS-SCNC: 12 MMOL/L (ref 7–15)
AST SERPL W P-5'-P-CCNC: 51 U/L (ref 0–45)
BASOPHILS # BLD AUTO: 0 10E3/UL (ref 0–0.2)
BASOPHILS NFR BLD AUTO: 0 %
BILIRUB SERPL-MCNC: 0.2 MG/DL
BUN SERPL-MCNC: 15.8 MG/DL (ref 6–20)
CALCIUM SERPL-MCNC: 9.6 MG/DL (ref 8.6–10)
CHLORIDE SERPL-SCNC: 105 MMOL/L (ref 98–107)
CHOLEST SERPL-MCNC: 152 MG/DL
CREAT SERPL-MCNC: 0.76 MG/DL (ref 0.51–1.17)
DEPRECATED HCO3 PLAS-SCNC: 21 MMOL/L (ref 22–29)
EGFRCR SERPLBLD CKD-EPI 2021: >90 ML/MIN/1.73M2
EOSINOPHIL # BLD AUTO: 0.1 10E3/UL (ref 0–0.7)
EOSINOPHIL NFR BLD AUTO: 2 %
ERYTHROCYTE [DISTWIDTH] IN BLOOD BY AUTOMATED COUNT: 12.1 % (ref 10–15)
ESTRADIOL SERPL-MCNC: 65 PG/ML
FASTING STATUS PATIENT QL REPORTED: ABNORMAL
FASTING STATUS PATIENT QL REPORTED: ABNORMAL
GLUCOSE SERPL-MCNC: 115 MG/DL (ref 70–99)
HCT VFR BLD AUTO: 40.3 % (ref 35–53)
HDLC SERPL-MCNC: 43 MG/DL
HGB BLD-MCNC: 13.9 G/DL (ref 11.7–17.7)
IMM GRANULOCYTES # BLD: 0 10E3/UL
IMM GRANULOCYTES NFR BLD: 0 %
LDLC SERPL CALC-MCNC: 72 MG/DL
LYMPHOCYTES # BLD AUTO: 1.9 10E3/UL (ref 0.8–5.3)
LYMPHOCYTES NFR BLD AUTO: 41 %
MCH RBC QN AUTO: 30 PG (ref 26.5–33)
MCHC RBC AUTO-ENTMCNC: 34.5 G/DL (ref 31.5–36.5)
MCV RBC AUTO: 87 FL (ref 78–100)
MONOCYTES # BLD AUTO: 0.6 10E3/UL (ref 0–1.3)
MONOCYTES NFR BLD AUTO: 12 %
NEUTROPHILS # BLD AUTO: 2 10E3/UL (ref 1.6–8.3)
NEUTROPHILS NFR BLD AUTO: 44 %
NONHDLC SERPL-MCNC: 109 MG/DL
PLATELET # BLD AUTO: 257 10E3/UL (ref 150–450)
POTASSIUM SERPL-SCNC: 4.2 MMOL/L (ref 3.4–5.3)
PROT SERPL-MCNC: 8.2 G/DL (ref 6.4–8.3)
RBC # BLD AUTO: 4.64 10E6/UL (ref 3.8–5.9)
SHBG SERPL-SCNC: 92 NMOL/L (ref 11–135)
SODIUM SERPL-SCNC: 138 MMOL/L (ref 135–145)
TRIGL SERPL-MCNC: 185 MG/DL
WBC # BLD AUTO: 4.7 10E3/UL (ref 4–11)

## 2024-07-12 PROCEDURE — 84403 ASSAY OF TOTAL TESTOSTERONE: CPT

## 2024-07-12 PROCEDURE — 82670 ASSAY OF TOTAL ESTRADIOL: CPT

## 2024-07-12 PROCEDURE — 85025 COMPLETE CBC W/AUTO DIFF WBC: CPT | Mod: QW

## 2024-07-12 PROCEDURE — 80061 LIPID PANEL: CPT | Mod: GZ

## 2024-07-12 PROCEDURE — 36415 COLL VENOUS BLD VENIPUNCTURE: CPT

## 2024-07-12 PROCEDURE — 84270 ASSAY OF SEX HORMONE GLOBUL: CPT

## 2024-07-12 PROCEDURE — 80053 COMPREHEN METABOLIC PANEL: CPT

## 2024-07-14 LAB
TESTOST FREE SERPL-MCNC: 5.57 NG/DL
TESTOST SERPL-MCNC: 560 NG/DL (ref 8–950)

## 2024-07-22 DIAGNOSIS — F64.0 GENDER DYSPHORIA IN ADULT: ICD-10-CM

## 2024-07-23 RX ORDER — ESTRADIOL 2 MG/1
4 TABLET ORAL DAILY
Qty: 30 TABLET | Refills: 1 | OUTPATIENT
Start: 2024-07-23

## 2024-07-24 ENCOUNTER — TELEPHONE (OUTPATIENT)
Dept: FAMILY MEDICINE | Facility: CLINIC | Age: 44
End: 2024-07-24
Payer: MEDICARE

## 2024-07-24 DIAGNOSIS — F64.0 GENDER DYSPHORIA IN ADULT: Primary | ICD-10-CM

## 2024-07-24 RX ORDER — ESTRADIOL 2 MG/1
2 TABLET ORAL DAILY
Qty: 30 TABLET | Refills: 0 | OUTPATIENT
Start: 2024-07-24

## 2024-07-24 RX ORDER — ESTRADIOL 2 MG/1
4 TABLET ORAL DAILY
Qty: 60 TABLET | Refills: 1 | Status: SHIPPED | OUTPATIENT
Start: 2024-07-24 | End: 2024-07-30

## 2024-07-24 NOTE — TELEPHONE ENCOUNTER
General Call    Contacts       Contact Date/Time Type Contact Phone/Fax    07/24/2024 08:54 AM CDT Phone (Incoming) Amanda - [facility] [phone]     [facility]          Reason for Call:  Follow up Appointment    What are your questions or concerns:  Amanda calling to confirm if follow up appointment can be virtual or if it needs to be in clinic.     Informed Amanda of lab results message and provider wanting to increase Estradiol to 4mg daily. Amanda expressed understanding and states they only have about 4 pills left of current 2mg dose.       Should follow up appointment be moved back due to increase in Estradiol dose?

## 2024-07-30 ENCOUNTER — VIRTUAL VISIT (OUTPATIENT)
Dept: FAMILY MEDICINE | Facility: CLINIC | Age: 44
End: 2024-07-30
Payer: MEDICARE

## 2024-07-30 DIAGNOSIS — Z59.00 HOMELESS: ICD-10-CM

## 2024-07-30 DIAGNOSIS — F64.0 GENDER DYSPHORIA IN ADULT: Primary | ICD-10-CM

## 2024-07-30 PROCEDURE — G2211 COMPLEX E/M VISIT ADD ON: HCPCS | Mod: 95 | Performed by: FAMILY MEDICINE

## 2024-07-30 PROCEDURE — 99213 OFFICE O/P EST LOW 20 MIN: CPT | Mod: 95 | Performed by: FAMILY MEDICINE

## 2024-07-30 RX ORDER — ESTRADIOL 2 MG/1
4 TABLET ORAL DAILY
Qty: 60 TABLET | Refills: 1 | Status: SHIPPED | OUTPATIENT
Start: 2024-07-30

## 2024-07-30 SDOH — ECONOMIC STABILITY - HOUSING INSECURITY: HOMELESSNESS UNSPECIFIED: Z59.00

## 2024-07-30 NOTE — PROGRESS NOTES
Artemio is a 44 year old who is being evaluated via a billable video visit.    How would you like to obtain your AVS? MyChart  If the video visit is dropped, the invitation should be resent by: Text to cell phone: [phone]  Will anyone else be joining your video visit? No      Assessment & Plan   Problem List Items Addressed This Visit       Incarceration    Gender dysphoria in adult - Primary    Relevant Medications    estradiol (ESTRACE) 2 MG tablet    Other Relevant Orders    Comprehensive Gender Care Referral     Other Visit Diagnoses       Homeless                 Patient and Amanda from the [facility] joined me today for patient's video appointment.  Patient will be released from half-way in approximately 20 days.  There was some confusion over the increase in the dose of estradiol so patient is still been on the 2 mg of estradiol daily.  Clarified that I think it is time for patient to be moved up to the 4 mg daily.  Would like patient to be on this for at least a month before we do repeat lab testing.  Patient plans to be released from half-way in approximately 20 days.  They do intend to continue to follow-up with me.  They also think that they will have to move back to [place] to be able to get assistance with housing.  Gender care referral placed back to patient's previous gender care provider in [place].  Would like patient to have labs done in approximately 4 weeks.  They are welcome to return to clinic to see me while they are living in the area.     The longitudinal plan of care for the diagnosis(es)/condition(s) as documented were addressed during this visit. Due to the added complexity in care, I will continue to support Artemio in the subsequent management and with ongoing continuity of care.             Eladia Ulloa is a 44 year old, presenting for the following health issues:  Follow Up ([facility] follow up. They were not able to start the 2 mg estradiol yet due to pharmacy mix  up. Text link to [phone])        7/30/2024    11:27 AM   Additional Questions   Roomed by Porsche     Video Start Time: 12:19 PM    HPI                 Objective           Vitals:  No vitals were obtained today due to virtual visit.    Physical Exam   GENERAL: alert and no distress  EYES: Eyes grossly normal to inspection.  No discharge or erythema, or obvious scleral/conjunctival abnormalities.  RESP: No audible wheeze, cough, or visible cyanosis.    SKIN: Visible skin clear. No significant rash, abnormal pigmentation or lesions.  NEURO: Cranial nerves grossly intact.  Mentation and speech appropriate for age.  PSYCH: Appropriate affect, tone, and pace of words          Video-Visit Details    Type of service:  Video Visit   Video End Time: 1242 pm  Originating Location (pt. Location): [facility]    Distant Location (provider location):  On-site  Platform used for Video Visit: Karla  Signed Electronically by: Claire Miles MD

## 2024-07-31 ENCOUNTER — TELEPHONE (OUTPATIENT)
Dept: PLASTIC SURGERY | Facility: CLINIC | Age: 44
End: 2024-07-31
Payer: MEDICARE

## 2024-07-31 NOTE — CONFIDENTIAL NOTE
Writer DEMETRIS re: referral from Dr. Miles. Pt referred for gender care - unclear which service.

## 2024-08-02 ENCOUNTER — TELEPHONE (OUTPATIENT)
Dept: PLASTIC SURGERY | Facility: CLINIC | Age: 44
End: 2024-08-02
Payer: MEDICARE

## 2024-08-02 NOTE — CONFIDENTIAL NOTE
Writer DEMETRIS re: referral to [facility]. Second attempt to contact for gender care referral.

## 2025-03-26 LAB
ALBUMIN SERPL BCG-MCNC: 4.2 G/DL (ref 3.5–5.2)
ALP SERPL-CCNC: 69 U/L (ref 40–150)
ALT SERPL W P-5'-P-CCNC: 75 U/L (ref 0–70)
ANION GAP SERPL CALCULATED.3IONS-SCNC: 11 MMOL/L (ref 7–15)
AST SERPL W P-5'-P-CCNC: 69 U/L (ref 0–45)
BASOPHILS # BLD AUTO: 0 10E3/UL (ref 0–0.2)
BASOPHILS NFR BLD AUTO: 0 %
BILIRUB SERPL-MCNC: 0.4 MG/DL
BUN SERPL-MCNC: 12.4 MG/DL (ref 6–20)
CALCIUM SERPL-MCNC: 9.4 MG/DL (ref 8.8–10.4)
CHLORIDE SERPL-SCNC: 103 MMOL/L (ref 98–107)
CHOLEST SERPL-MCNC: 176 MG/DL
CREAT SERPL-MCNC: 0.75 MG/DL (ref 0.51–1.17)
EGFRCR SERPLBLD CKD-EPI 2021: >90 ML/MIN/1.73M2
EOSINOPHIL # BLD AUTO: 0.1 10E3/UL (ref 0–0.7)
EOSINOPHIL NFR BLD AUTO: 1 %
ERYTHROCYTE [DISTWIDTH] IN BLOOD BY AUTOMATED COUNT: 12 % (ref 10–15)
FASTING STATUS PATIENT QL REPORTED: NO
FASTING STATUS PATIENT QL REPORTED: NO
GLUCOSE SERPL-MCNC: 92 MG/DL (ref 70–99)
HCO3 SERPL-SCNC: 22 MMOL/L (ref 22–29)
HCT VFR BLD AUTO: 40.4 % (ref 35–53)
HDLC SERPL-MCNC: 58 MG/DL
HGB BLD-MCNC: 14.1 G/DL (ref 11.7–17.7)
IMM GRANULOCYTES # BLD: 0 10E3/UL
IMM GRANULOCYTES NFR BLD: 1 %
LDLC SERPL CALC-MCNC: 98 MG/DL
LYMPHOCYTES # BLD AUTO: 2.4 10E3/UL (ref 0.8–5.3)
LYMPHOCYTES NFR BLD AUTO: 31 %
MCH RBC QN AUTO: 30.2 PG (ref 26.5–33)
MCHC RBC AUTO-ENTMCNC: 34.9 G/DL (ref 31.5–36.5)
MCV RBC AUTO: 87 FL (ref 78–100)
MONOCYTES # BLD AUTO: 0.8 10E3/UL (ref 0–1.3)
MONOCYTES NFR BLD AUTO: 11 %
NEUTROPHILS # BLD AUTO: 4.3 10E3/UL (ref 1.6–8.3)
NEUTROPHILS NFR BLD AUTO: 57 %
NONHDLC SERPL-MCNC: 118 MG/DL
NRBC # BLD AUTO: 0 10E3/UL
NRBC BLD AUTO-RTO: 0 /100
PLATELET # BLD AUTO: 320 10E3/UL (ref 150–450)
POTASSIUM SERPL-SCNC: 4 MMOL/L (ref 3.4–5.3)
PROT SERPL-MCNC: 8.2 G/DL (ref 6.4–8.3)
RBC # BLD AUTO: 4.67 10E6/UL (ref 3.8–5.9)
SHBG SERPL-SCNC: 213 NMOL/L (ref 11–135)
SODIUM SERPL-SCNC: 136 MMOL/L (ref 135–145)
TRIGL SERPL-MCNC: 99 MG/DL
WBC # BLD AUTO: 7.6 10E3/UL (ref 4–11)

## 2025-03-31 LAB
TESTOST FREE SERPL-MCNC: 0.03 NG/DL
TESTOST SERPL-MCNC: 8 NG/DL (ref 8–950)